# Patient Record
Sex: FEMALE | Race: WHITE | NOT HISPANIC OR LATINO | Employment: OTHER | URBAN - METROPOLITAN AREA
[De-identification: names, ages, dates, MRNs, and addresses within clinical notes are randomized per-mention and may not be internally consistent; named-entity substitution may affect disease eponyms.]

---

## 2017-11-21 ENCOUNTER — OFFICE VISIT (OUTPATIENT)
Dept: URGENT CARE | Facility: CLINIC | Age: 52
End: 2017-11-21
Payer: COMMERCIAL

## 2017-11-21 VITALS
OXYGEN SATURATION: 97 % | WEIGHT: 175 LBS | TEMPERATURE: 100 F | HEIGHT: 64 IN | SYSTOLIC BLOOD PRESSURE: 123 MMHG | BODY MASS INDEX: 29.88 KG/M2 | DIASTOLIC BLOOD PRESSURE: 86 MMHG | RESPIRATION RATE: 18 BRPM | HEART RATE: 92 BPM

## 2017-11-21 DIAGNOSIS — J06.9 UPPER RESPIRATORY TRACT INFECTION, UNSPECIFIED TYPE: Primary | ICD-10-CM

## 2017-11-21 LAB
CTP QC/QA: YES
FLUAV AG NPH QL: NEGATIVE
FLUBV AG NPH QL: NEGATIVE

## 2017-11-21 PROCEDURE — 99214 OFFICE O/P EST MOD 30 MIN: CPT | Mod: S$GLB,,, | Performed by: FAMILY MEDICINE

## 2017-11-21 PROCEDURE — 87804 INFLUENZA ASSAY W/OPTIC: CPT | Mod: QW,S$GLB,, | Performed by: FAMILY MEDICINE

## 2017-11-21 RX ORDER — BENZONATATE 100 MG/1
100 CAPSULE ORAL EVERY 6 HOURS PRN
Qty: 30 CAPSULE | Refills: 1 | Status: SHIPPED | OUTPATIENT
Start: 2017-11-21 | End: 2018-11-21

## 2017-11-21 NOTE — PATIENT INSTRUCTIONS
Viral Upper Respiratory Illness (Adult)  You have a viral upper respiratory illness (URI), which is another term for the common cold. This illness is contagious during the first few days. It is spread through the air by coughing and sneezing. It may also be spread by direct contact (touching the sick person and then touching your own eyes, nose, or mouth). Frequent handwashing will decrease risk of spread. Most viral illnesses go away within 7 to 10 days with rest and simple home remedies. Sometimes the illness may last for several weeks. Antibiotics will not kill a virus, and they are generally not prescribed for this condition.    Home care  · If symptoms are severe, rest at home for the first 2 to 3 days. When you resume activity, don't let yourself get too tired.  · Avoid being exposed to cigarette smoke (yours or others).  · You may use acetaminophen or ibuprofen to control pain and fever, unless another medicine was prescribed. (Note: If you have chronic liver or kidney disease, have ever had a stomach ulcer or gastrointestinal bleeding, or are taking blood-thinning medicines, talk with your healthcare provider before using these medicines.) Aspirin should never be given to anyone under 18 years of age who is ill with a viral infection or fever. It may cause severe liver or brain damage.  · Your appetite may be poor, so a light diet is fine. Avoid dehydration by drinking 6 to 8 glasses of fluids per day (water, soft drinks, juices, tea, or soup). Extra fluids will help loosen secretions in the nose and lungs.  · Over-the-counter cold medicines will not shorten the length of time youre sick, but they may be helpful for the following symptoms: cough, sore throat, and nasal and sinus congestion. (Note: Do not use decongestants if you have high blood pressure.)  Follow-up care  Follow up with your healthcare provider, or as advised.  When to seek medical advice  Call your healthcare provider right away if any  of these occur:  · Cough with lots of colored sputum (mucus)  · Severe headache; face, neck, or ear pain  · Difficulty swallowing due to throat pain  · Fever of 100.4°F (38°C)  Call 911, or get immediate medical care  Call emergency services right away if any of these occur:  · Chest pain, shortness of breath, wheezing, or difficulty breathing  · Coughing up blood  · Inability to swallow due to throat pain  Date Last Reviewed: 9/13/2015  © 9196-8325 [employer]. [address] [place] [address]. All rights reserved. This information is not intended as a substitute for professional medical care. Always follow your healthcare professional's instructions.

## 2017-11-21 NOTE — PROGRESS NOTES
"Subjective:       Patient ID: Priscilla Khan is a 52 y.o. female.    Vitals:  height is 5' 4" (1.626 m) and weight is 79.4 kg (175 lb). Her oral temperature is 100.3 °F (37.9 °C). Her blood pressure is 123/86 and her pulse is 92. Her respiration is 18 and oxygen saturation is 97%.     Chief Complaint: Cough    Cough, fever, sore throat and body aches x 1 day.  Pt has taken mucinex and nsaids with no relief.  No n/v/d.      Cough   This is a new problem. The current episode started yesterday. The problem has been gradually worsening. The cough is non-productive. Associated symptoms include chills, a fever, myalgias and a sore throat. Pertinent negatives include no chest pain, ear pain, eye redness, headaches, shortness of breath or wheezing. The symptoms are aggravated by cold air. She has tried OTC cough suppressant for the symptoms. The treatment provided no relief. Her past medical history is significant for asthma, bronchitis, environmental allergies and pneumonia.     Review of Systems   Constitution: Positive for chills, fever and malaise/fatigue.   HENT: Positive for congestion and sore throat. Negative for ear pain and hoarse voice.    Eyes: Negative for discharge and redness.   Cardiovascular: Negative for chest pain, dyspnea on exertion and leg swelling.   Respiratory: Positive for cough. Negative for shortness of breath, sputum production and wheezing.    Musculoskeletal: Positive for myalgias.   Gastrointestinal: Positive for abdominal pain. Negative for nausea.   Neurological: Negative for headaches.   Allergic/Immunologic: Positive for environmental allergies.       Objective:      Physical Exam   Constitutional: She appears well-developed and well-nourished.   HENT:   Head: Normocephalic and atraumatic.   Nose: Mucosal edema present. No rhinorrhea. Right sinus exhibits no maxillary sinus tenderness and no frontal sinus tenderness. Left sinus exhibits no maxillary sinus tenderness and no frontal sinus " tenderness.   Mouth/Throat: Posterior oropharyngeal erythema present. No oropharyngeal exudate.   Eyes: EOM are normal. Pupils are equal, round, and reactive to light.   Neck: Normal range of motion. Neck supple.   Cardiovascular: Normal rate, regular rhythm and normal heart sounds.    Pulmonary/Chest: Effort normal and breath sounds normal.   Abdominal: Soft.   Lymphadenopathy:     She has cervical adenopathy (MILDLY TENDER).   Nursing note and vitals reviewed.      Results for orders placed or performed in visit on 11/21/17   POCT Influenza A/B   Result Value Ref Range    Rapid Influenza A Ag Negative Negative    Rapid Influenza B Ag Negative Negative     Acceptable Yes      Assessment:       1. Upper respiratory tract infection, unspecified type        Plan:         Upper respiratory tract infection, unspecified type  -     POCT Influenza A/B  -     benzonatate (TESSALON PERLES) 100 MG capsule; Take 1 capsule (100 mg total) by mouth every 6 (six) hours as needed for Cough.  Dispense: 30 capsule; Refill: 1    OTC analgesia

## 2017-12-03 ENCOUNTER — HOSPITAL ENCOUNTER (OUTPATIENT)
Facility: HOSPITAL | Age: 52
Discharge: HOME | End: 2017-12-04
Attending: EMERGENCY MEDICINE | Admitting: HOSPITALIST
Payer: COMMERCIAL

## 2017-12-03 DIAGNOSIS — R07.9 CHEST PAIN: Primary | ICD-10-CM

## 2017-12-03 LAB
ALBUMIN SERPL BCP-MCNC: 4 G/DL
ALP SERPL-CCNC: 91 U/L
ALT SERPL W/O P-5'-P-CCNC: 29 U/L
AMPHET+METHAMPHET UR QL: NEGATIVE
ANION GAP SERPL CALC-SCNC: 9 MMOL/L
AST SERPL-CCNC: 25 U/L
BARBITURATES UR QL SCN>200 NG/ML: NEGATIVE
BASOPHILS # BLD AUTO: 0.03 K/UL
BASOPHILS NFR BLD: 0.4 %
BENZODIAZ UR QL SCN>200 NG/ML: NORMAL
BILIRUB SERPL-MCNC: 0.8 MG/DL
BNP SERPL-MCNC: 91 PG/ML
BUN SERPL-MCNC: 15 MG/DL
BZE UR QL SCN: NEGATIVE
CALCIUM SERPL-MCNC: 9.7 MG/DL
CANNABINOIDS UR QL SCN: NEGATIVE
CHLORIDE SERPL-SCNC: 105 MMOL/L
CO2 SERPL-SCNC: 27 MMOL/L
CREAT SERPL-MCNC: 0.8 MG/DL
CREAT UR-MCNC: 148 MG/DL
DIFFERENTIAL METHOD: ABNORMAL
EOSINOPHIL # BLD AUTO: 0.2 K/UL
EOSINOPHIL NFR BLD: 2 %
ERYTHROCYTE [DISTWIDTH] IN BLOOD BY AUTOMATED COUNT: 12.2 %
EST. GFR  (AFRICAN AMERICAN): >60 ML/MIN/1.73 M^2
EST. GFR  (NON AFRICAN AMERICAN): >60 ML/MIN/1.73 M^2
ESTIMATED AVG GLUCOSE: 108 MG/DL
ETHANOL UR-MCNC: <10 MG/DL
GLUCOSE SERPL-MCNC: 98 MG/DL
HBA1C MFR BLD HPLC: 5.4 %
HCT VFR BLD AUTO: 35.5 %
HGB BLD-MCNC: 12 G/DL
IMM GRANULOCYTES # BLD AUTO: 0.02 K/UL
IMM GRANULOCYTES NFR BLD AUTO: 0.3 %
LIPASE SERPL-CCNC: 10 U/L
LYMPHOCYTES # BLD AUTO: 3.6 K/UL
LYMPHOCYTES NFR BLD: 45.3 %
MCH RBC QN AUTO: 30.4 PG
MCHC RBC AUTO-ENTMCNC: 33.8 G/DL
MCV RBC AUTO: 90 FL
METHADONE UR QL SCN>300 NG/ML: NEGATIVE
MONOCYTES # BLD AUTO: 0.5 K/UL
MONOCYTES NFR BLD: 6.8 %
NEUTROPHILS # BLD AUTO: 3.6 K/UL
NEUTROPHILS NFR BLD: 45.2 %
NRBC BLD-RTO: 0 /100 WBC
OPIATES UR QL SCN: NEGATIVE
PCP UR QL SCN>25 NG/ML: NEGATIVE
PLATELET # BLD AUTO: 287 K/UL
PMV BLD AUTO: 9.7 FL
POTASSIUM SERPL-SCNC: 3.8 MMOL/L
PROT SERPL-MCNC: 7.5 G/DL
RBC # BLD AUTO: 3.95 M/UL
SODIUM SERPL-SCNC: 141 MMOL/L
TOXICOLOGY INFORMATION: NORMAL
TROPONIN I SERPL DL<=0.01 NG/ML-MCNC: 0.01 NG/ML
TROPONIN I SERPL DL<=0.01 NG/ML-MCNC: <0.006 NG/ML
TSH SERPL DL<=0.005 MIU/L-ACNC: 0.94 UIU/ML
WBC # BLD AUTO: 7.95 K/UL

## 2017-12-03 PROCEDURE — 85025 COMPLETE CBC W/AUTO DIFF WBC: CPT

## 2017-12-03 PROCEDURE — 99284 EMERGENCY DEPT VISIT MOD MDM: CPT | Mod: 25

## 2017-12-03 PROCEDURE — 83036 HEMOGLOBIN GLYCOSYLATED A1C: CPT

## 2017-12-03 PROCEDURE — 83880 ASSAY OF NATRIURETIC PEPTIDE: CPT

## 2017-12-03 PROCEDURE — G0378 HOSPITAL OBSERVATION PER HR: HCPCS

## 2017-12-03 PROCEDURE — 36415 COLL VENOUS BLD VENIPUNCTURE: CPT

## 2017-12-03 PROCEDURE — 25000003 PHARM REV CODE 250: Performed by: EMERGENCY MEDICINE

## 2017-12-03 PROCEDURE — 93005 ELECTROCARDIOGRAM TRACING: CPT

## 2017-12-03 PROCEDURE — 80053 COMPREHEN METABOLIC PANEL: CPT

## 2017-12-03 PROCEDURE — 99284 EMERGENCY DEPT VISIT MOD MDM: CPT | Mod: ,,, | Performed by: EMERGENCY MEDICINE

## 2017-12-03 PROCEDURE — 93010 ELECTROCARDIOGRAM REPORT: CPT | Mod: ,,, | Performed by: INTERNAL MEDICINE

## 2017-12-03 PROCEDURE — 84484 ASSAY OF TROPONIN QUANT: CPT

## 2017-12-03 PROCEDURE — 84443 ASSAY THYROID STIM HORMONE: CPT

## 2017-12-03 PROCEDURE — 99219 PR INITIAL OBSERVATION CARE,LEVL II: CPT | Mod: ,,, | Performed by: HOSPITALIST

## 2017-12-03 PROCEDURE — 80307 DRUG TEST PRSMV CHEM ANLYZR: CPT

## 2017-12-03 PROCEDURE — 83690 ASSAY OF LIPASE: CPT

## 2017-12-03 PROCEDURE — 84484 ASSAY OF TROPONIN QUANT: CPT | Mod: 91

## 2017-12-03 RX ORDER — SODIUM CHLORIDE 0.9 % (FLUSH) 0.9 %
5 SYRINGE (ML) INJECTION
Status: DISCONTINUED | OUTPATIENT
Start: 2017-12-03 | End: 2017-12-04 | Stop reason: HOSPADM

## 2017-12-03 RX ORDER — ENOXAPARIN SODIUM 100 MG/ML
40 INJECTION SUBCUTANEOUS EVERY 24 HOURS
Status: DISCONTINUED | OUTPATIENT
Start: 2017-12-03 | End: 2017-12-04 | Stop reason: HOSPADM

## 2017-12-03 RX ORDER — AMOXICILLIN 250 MG
1 CAPSULE ORAL 2 TIMES DAILY
Status: DISCONTINUED | OUTPATIENT
Start: 2017-12-03 | End: 2017-12-04 | Stop reason: HOSPADM

## 2017-12-03 RX ORDER — RAMIPRIL 10 MG/1
10 CAPSULE ORAL DAILY
Status: DISCONTINUED | OUTPATIENT
Start: 2017-12-04 | End: 2017-12-04 | Stop reason: HOSPADM

## 2017-12-03 RX ORDER — ATORVASTATIN CALCIUM 20 MG/1
20 TABLET, FILM COATED ORAL DAILY
Status: DISCONTINUED | OUTPATIENT
Start: 2017-12-04 | End: 2017-12-04 | Stop reason: HOSPADM

## 2017-12-03 RX ORDER — PANTOPRAZOLE SODIUM 40 MG/1
40 TABLET, DELAYED RELEASE ORAL DAILY
Status: DISCONTINUED | OUTPATIENT
Start: 2017-12-04 | End: 2017-12-04 | Stop reason: HOSPADM

## 2017-12-03 RX ORDER — NITROGLYCERIN 0.4 MG/1
0.4 TABLET SUBLINGUAL EVERY 5 MIN PRN
Status: DISCONTINUED | OUTPATIENT
Start: 2017-12-03 | End: 2017-12-04 | Stop reason: HOSPADM

## 2017-12-03 RX ORDER — ASPIRIN 325 MG
325 TABLET ORAL
Status: COMPLETED | OUTPATIENT
Start: 2017-12-03 | End: 2017-12-03

## 2017-12-03 RX ORDER — BENZONATATE 100 MG/1
100 CAPSULE ORAL EVERY 6 HOURS PRN
Status: DISCONTINUED | OUTPATIENT
Start: 2017-12-03 | End: 2017-12-04 | Stop reason: HOSPADM

## 2017-12-03 RX ADMIN — NITROGLYCERIN 0.4 MG: 0.4 TABLET SUBLINGUAL at 01:12

## 2017-12-03 RX ADMIN — ASPIRIN 325 MG ORAL TABLET 325 MG: 325 PILL ORAL at 01:12

## 2017-12-03 NOTE — ED NOTES
Patient updated on POC and room assignment. Denies chest pain at this time; VSS. Will continue to monitor.

## 2017-12-03 NOTE — ASSESSMENT & PLAN NOTE
Telemetry  Serial trop : .006 -> .009  EKG - NSR w LVH, 173/81. 125 /71  Stress echo in an if all ok.  PPI

## 2017-12-03 NOTE — ED NOTES
Pt placed on cardiac monitor, continuous pulse ox, cycling blood pressures. Side rails up x2, call bell in reach, bed in low position with brake engaged. Family at bedside.

## 2017-12-03 NOTE — ED NOTES
Assumed care of patient. Continous VS monitoring in place. Patient given SL nitro for chest pain 9/10.

## 2017-12-03 NOTE — ED NOTES
LOC: The patient is awake and alert; oriented x 3 and speaking appropriately.  APPEARANCE: Patient resting comfortably, patient is clean and well groomed, anxious  SKIN: warm and dry, normal skin turgor & moist mucus membranes, skin intact, no breakdown noted.  MUSCULOSKELETAL: Patient moving all extremities well, no obvious swelling or deformities noted  RESPIRATORY: Airway is open and patent,  respirations are spontaneous, normal effort and rate  CARDIAC: Patient has a normal rate, no peripheral edema noted, capillary refill < 3 seconds; complaints of chest pain in sternal area, sharp, radiating thru to upper back.   ABDOMEN: Soft and non tender to palpation, no distention noted.

## 2017-12-03 NOTE — ED PROVIDER NOTES
"Encounter Date: 12/3/2017    SCRIBE #1 NOTE: I, Rosmery Castro, am scribing for, and in the presence of,  Dr. Szymanski. I have scribed the following portions of the note - the Resident attestation.       History     Chief Complaint   Patient presents with    Chest Pain     left sided chest pain radiating to left next, onset 10 mins ago, no SOB, no N/V, c/o "little" dizzy     52 year old F presents with sudden onset chest pain that began 15 minutes PTA, substernal with radiation neck and back occurred while at rest. Quality Twisting and aching. No assoc Diaphoresis, SOB, Nausea at this time. She has sig Pmhx of HTN, HLD and concerning family history. Patient felt it may have been panic attack but reports no stressful event and no relief from xanax. Patient also has hx of GERD but no recent meals, spicy food, and feels that this pain is different than her typical GERD pain.           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    Posterior glottic pachydermia noted on endoscopy during ENT visit    Rhinitis, chronic     Vocal cord dysfunction 10/1/2015     Past Surgical History:   Procedure Laterality Date     SECTION       Family History   Problem Relation Age of Onset    Coronary artery disease Mother     Heart failure Father     Cancer Sister      breast    Coronary artery disease Brother      Social History   Substance Use Topics    Smoking status: Never Smoker    Smokeless tobacco: Never Used    Alcohol use 0.0 oz/week     Review of Systems   Constitutional: Negative for activity change, appetite change, chills, fatigue, fever and unexpected weight change.   HENT: Negative for congestion and " sinus pressure.    Eyes: Negative for discharge and visual disturbance.   Respiratory: Positive for cough. Negative for apnea, chest tightness, shortness of breath and wheezing.    Cardiovascular: Positive for chest pain. Negative for palpitations and leg swelling.   Gastrointestinal: Negative for abdominal distention, abdominal pain, diarrhea, nausea and vomiting.   Genitourinary: Negative for difficulty urinating and dysuria.   Musculoskeletal: Negative for arthralgias, back pain and myalgias.   Skin: Negative for color change and pallor.   Neurological: Negative for dizziness, syncope, light-headedness and headaches.   Psychiatric/Behavioral: Negative for agitation and behavioral problems. The patient is nervous/anxious.        Physical Exam     Initial Vitals [12/03/17 1306]   BP Pulse Resp Temp SpO2   (!) 173/81 77 18 99.3 °F (37.4 °C) 100 %      MAP       111.67         Physical Exam    Nursing note and vitals reviewed.  Constitutional: She appears well-developed and well-nourished. She is not diaphoretic.   Stated age, appears uncomfortable   HENT:   Head: Normocephalic and atraumatic.   Mouth/Throat: No oropharyngeal exudate.   Eyes: EOM are normal. Pupils are equal, round, and reactive to light. No scleral icterus.   Neck: Normal range of motion. Neck supple. No JVD present.   Cardiovascular: Normal rate, regular rhythm and normal heart sounds. Exam reveals no friction rub.    No murmur heard.  Pulmonary/Chest: Breath sounds normal. No respiratory distress. She has no wheezes. She has no rales.   Abdominal: Soft. Bowel sounds are normal. She exhibits no distension. There is no tenderness.   Musculoskeletal: Normal range of motion. She exhibits no edema or tenderness.   Lymphadenopathy:     She has no cervical adenopathy.   Neurological: She is alert and oriented to person, place, and time. She has normal strength. No sensory deficit.   Skin: Skin is warm and dry. Capillary refill takes less than 2 seconds.          ED Course   Procedures  Labs Reviewed   CBC W/ AUTO DIFFERENTIAL - Abnormal; Notable for the following:        Result Value    RBC 3.95 (*)     Hematocrit 35.5 (*)     All other components within normal limits   COMPREHENSIVE METABOLIC PANEL   TROPONIN I   B-TYPE NATRIURETIC PEPTIDE             Medical Decision Making:   History:   Old Medical Records: I decided to obtain old medical records.  Clinical Tests:   Lab Tests: Ordered and Reviewed  Radiological Study: Ordered and Reviewed  Medical Tests: Ordered and Reviewed       APC / Resident Notes:   HOIV MDM:  52 year old M with sig Pmhx of HTN, HLD, +Fam hx of CAD who presents with sudden onset concerning chest pain.     Concern for: NSTEMI, ACS, Angina, Pneumonia, pericarditis, GERD, anxiety attack    Plan: Will initiate cardiac workup at this time. EKG appears stable with no concerning ST elevations at this time. WIll provide Nitroglycerine to aid in relief. ASA will be provided.      Hilton Lozada MD  [employer] EM PGY-IV  1:14 PM      HOIV UPdate:  Initial troponin negative. Patient's pain improved with SLG Nitro. Due to risk factors and acute chest pain will admit for observation to internal medicine.  Hilton Lozada MD  [employer] EM PGY-IV  3:12 PM         Scribe Attestation:   Scribe #1: I performed the above scribed service and the documentation accurately describes the services I performed. I attest to the accuracy of the note.    Attending Attestation:   Physician Attestation Statement for Resident:  As the supervising MD   Physician Attestation Statement: I have personally seen and examined this patient.   I agree with the above history. -: 52 y.o. woman complains of an episode of chest discomfort radiating to her neck and back. She reports that it began feeling like a panic attack, however, it did not resolve when she took one of her anti-anxiety meds, and her panic attacks always resolve when she takes her anti-anxiety meds. Because it was worsening, she  decided to come here to the ED. Her pain resolved with one sublingual nitroglycerin. She denies shortness of breath, nausea, diaphoresis.    As the supervising MD I agree with the above PE.   -: Heart has regular rate and rhythm without murmur, rub, or gallop. Lungs are clear to auscultation.    As the supervising MD I agree with the above treatment, course, plan, and disposition.   -: Given patient's risk factors for heart disease including FHx (father had heart attack in early 50s, brother with MI at 48, second brother with CABG in early 50s), patient will be admitted for monitoring and serial troponins.                     ED Course      Clinical Impression:   The encounter diagnosis was Chest pain.    Disposition:   Disposition: Placed in Observation                        Hilton Lozada MD  Resident  12/03/17 8585

## 2017-12-03 NOTE — PROVIDER PROGRESS NOTES - EMERGENCY DEPT.
Encounter Date: 12/3/2017    ED Physician Progress Notes         EKG - STEMI Decision  Initial Reading: No STEMI present.

## 2017-12-03 NOTE — HPI
52 year old F presents with sudden onset chest pain that began 15 minutes PTA, substernal with radiation neck and back occurred while at rest. Quality Twisting and aching. No assoc Diaphoresis, SOB, Nausea at this time. She has sig Pmhx of HTN, HLD and concerning family history. Patient felt it may have been panic attack but reports no stressful event and no relief from xanax. Patient also has hx of GERD but no recent meals, spicy food, and feels that this pain is different than her typical GERD pain.     She reports the pain was severe, unlike previous episodes.  Radiated through to back.  P high in the ED came down on its own.  She takes her BP once weekly and normall in 120s/70s.       PMH:  Gerd, laryngeal spasm, HTN, rhinits, hyperlilpidemia, c -section  All - NKDA  Meds- see MAR: xanax, hydrodiuil, lipitor, altace,

## 2017-12-03 NOTE — ED TRIAGE NOTES
Onset approx 30 minutes PTA while riding in a car going to buy a [employer] tree. Pain is in sternal area  , sharp and radiates thru to her upper back.  C/O SOB w/ pain , denies nausea.  Slitly dizziness. Having generalized weakness. Hx of panic attacks. This time it is not going away after taking her xanax. Had a viral infection last week.

## 2017-12-03 NOTE — NURSING
Pt arrived to OBS 2 via stretcher with ED tech. NO distress noted at this time. Pt denies chest pain. Peripheral IV saline locked. Cardiac monitor on./ Pt oriented to room. Instructed to call for assistance. Will continue to monitor.

## 2017-12-03 NOTE — ED NOTES
Notified MD that patient's chest pain went from 9/10 to 7/10 back to 9/10 after the first dose of SL nitro.

## 2017-12-04 ENCOUNTER — DOCUMENTATION ONLY (OUTPATIENT)
Dept: CARDIOLOGY | Facility: CLINIC | Age: 52
End: 2017-12-04

## 2017-12-04 VITALS
HEART RATE: 71 BPM | DIASTOLIC BLOOD PRESSURE: 75 MMHG | OXYGEN SATURATION: 93 % | RESPIRATION RATE: 17 BRPM | WEIGHT: 185 LBS | HEIGHT: 64 IN | TEMPERATURE: 98 F | BODY MASS INDEX: 31.58 KG/M2 | SYSTOLIC BLOOD PRESSURE: 132 MMHG

## 2017-12-04 LAB
ALBUMIN SERPL BCP-MCNC: 3.6 G/DL
ALP SERPL-CCNC: 77 U/L
ALT SERPL W/O P-5'-P-CCNC: 27 U/L
ANION GAP SERPL CALC-SCNC: 9 MMOL/L
AST SERPL-CCNC: 23 U/L
BILIRUB SERPL-MCNC: 0.7 MG/DL
BUN SERPL-MCNC: 19 MG/DL
CALCIUM SERPL-MCNC: 9.4 MG/DL
CHLORIDE SERPL-SCNC: 104 MMOL/L
CO2 SERPL-SCNC: 28 MMOL/L
CREAT SERPL-MCNC: 0.8 MG/DL
DIASTOLIC DYSFUNCTION: NO
EST. GFR  (AFRICAN AMERICAN): >60 ML/MIN/1.73 M^2
EST. GFR  (NON AFRICAN AMERICAN): >60 ML/MIN/1.73 M^2
GLUCOSE SERPL-MCNC: 106 MG/DL
MAGNESIUM SERPL-MCNC: 2.1 MG/DL
PHOSPHATE SERPL-MCNC: 4 MG/DL
POCT GLUCOSE: 100 MG/DL (ref 70–110)
POTASSIUM SERPL-SCNC: 4.3 MMOL/L
PROT SERPL-MCNC: 6.8 G/DL
RETIRED EF AND QEF - SEE NOTES: 60 (ref 55–65)
SODIUM SERPL-SCNC: 141 MMOL/L
TROPONIN I SERPL DL<=0.01 NG/ML-MCNC: 0.01 NG/ML
TROPONIN I SERPL DL<=0.01 NG/ML-MCNC: <0.006 NG/ML

## 2017-12-04 PROCEDURE — 84484 ASSAY OF TROPONIN QUANT: CPT

## 2017-12-04 PROCEDURE — 80053 COMPREHEN METABOLIC PANEL: CPT

## 2017-12-04 PROCEDURE — 93351 STRESS TTE COMPLETE: CPT | Mod: 26,,, | Performed by: INTERNAL MEDICINE

## 2017-12-04 PROCEDURE — 83735 ASSAY OF MAGNESIUM: CPT

## 2017-12-04 PROCEDURE — 25000003 PHARM REV CODE 250: Performed by: HOSPITALIST

## 2017-12-04 PROCEDURE — 93321 DOPPLER ECHO F-UP/LMTD STD: CPT | Mod: 26,,, | Performed by: INTERNAL MEDICINE

## 2017-12-04 PROCEDURE — 99217 PR OBSERVATION CARE DISCHARGE: CPT | Mod: ,,, | Performed by: HOSPITALIST

## 2017-12-04 PROCEDURE — G0378 HOSPITAL OBSERVATION PER HR: HCPCS

## 2017-12-04 PROCEDURE — 84100 ASSAY OF PHOSPHORUS: CPT

## 2017-12-04 RX ORDER — PANTOPRAZOLE SODIUM 40 MG/1
40 TABLET, DELAYED RELEASE ORAL DAILY
Qty: 30 TABLET | Refills: 11 | Status: SHIPPED | OUTPATIENT
Start: 2017-12-05 | End: 2022-03-03

## 2017-12-04 RX ORDER — AMOXICILLIN 250 MG
1 CAPSULE ORAL 2 TIMES DAILY
COMMUNITY
Start: 2017-12-04 | End: 2022-03-03

## 2017-12-04 RX ADMIN — PANTOPRAZOLE SODIUM 40 MG: 40 TABLET, DELAYED RELEASE ORAL at 09:12

## 2017-12-04 RX ADMIN — ATORVASTATIN CALCIUM 20 MG: 20 TABLET, FILM COATED ORAL at 09:12

## 2017-12-04 RX ADMIN — STANDARDIZED SENNA CONCENTRATE AND DOCUSATE SODIUM 1 TABLET: 8.6; 5 TABLET, FILM COATED ORAL at 09:12

## 2017-12-04 RX ADMIN — RAMIPRIL 10 MG: 10 CAPSULE ORAL at 09:12

## 2017-12-04 NOTE — H&P
"[facility] Hospital Medicine  History & Physical    Patient Name: Priscilla Khan  MRN: [id number]  Admission Date: 12/3/2017  Attending Physician: Bonnie Washburn MD  Primary Care Provider: Tsering Devine MD    [facility] Medicine Team: [facility] MED  Bonnie Washburn MD     Patient information was obtained from patient and ER records.     Subjective:     Principal Problem:Chest pain    Chief Complaint:   Chief Complaint   Patient presents with    Chest Pain     left sided chest pain radiating to left next, onset 10 mins ago, no SOB, no N/V, c/o "little" dizzy        HPI: 52 year old F presents with sudden onset chest pain that began 15 minutes PTA, substernal with radiation neck and back occurred while at rest. Quality Twisting and aching. No assoc Diaphoresis, SOB, Nausea at this time. She has sig Pmhx of HTN, HLD and concerning family history. Patient felt it may have been panic attack but reports no stressful event and no relief from xanax. Patient also has hx of GERD but no recent meals, spicy food, and feels that this pain is different than her typical GERD pain.     She reports the pain was severe, unlike previous episodes.  Radiated through to back.  P high in the ED came down on its own.  She takes her BP once weekly and normall in 120s/70s.       PMH:  Gerd, laryngeal spasm, HTN, rhinits, hyperlilpidemia, c -section  All - NKDA  Meds- see MAR: xanax, hydrodiuil, lipitor, altace,     Interval History: See hospital course      Review of Systems   Constitutional: Negative for chills, diaphoresis, fatigue and fever.   HENT: Negative for congestion, nosebleeds and trouble swallowing.    Eyes: Negative for photophobia and visual disturbance.   Respiratory: Negative for cough, choking, chest tightness and shortness of breath.    Cardiovascular: Positive for chest pain. Negative for leg swelling.   Gastrointestinal: Negative for abdominal distention, abdominal pain, constipation, diarrhea, " nausea and vomiting.   Genitourinary: Negative for difficulty urinating and urgency.   Musculoskeletal: Negative for arthralgias, back pain, gait problem, joint swelling, myalgias, neck pain and neck stiffness.   Skin: Negative for rash.   Neurological: Negative for dizziness, numbness and headaches.   Psychiatric/Behavioral: Negative for agitation, behavioral problems, confusion, decreased concentration and dysphoric mood.     Objective:     Vital Signs (Most Recent):  Temp: 98.2 °F (36.8 °C) (12/03/17 1634)  Pulse: 66 (12/03/17 1900)  Resp: 16 (12/03/17 1634)  BP: 135/69 (12/03/17 1634)  SpO2: 98 % (12/03/17 1634) Vital Signs (24h Range):  Temp:  [98.2 °F (36.8 °C)-99.3 °F (37.4 °C)] 98.2 °F (36.8 °C)  Pulse:  [62-81] 66  Resp:  [15-19] 16  SpO2:  [96 %-100 %] 98 %  BP: (125-173)/(69-98) 135/69     Weight: 81.6 kg (180 lb)  Body mass index is 30.9 kg/m².  No intake or output data in the 24 hours ending 12/03/17 2002   Physical Exam   Constitutional: She is oriented to person, place, and time. She appears well-developed and well-nourished.   HENT:   Head: Normocephalic and atraumatic.   Eyes: Conjunctivae are normal. Pupils are equal, round, and reactive to light. No scleral icterus.   Neck: Normal range of motion. Neck supple.   Cardiovascular: Normal rate, regular rhythm, normal heart sounds and intact distal pulses.    Pulmonary/Chest: Effort normal and breath sounds normal. She has no wheezes.   Abdominal: Soft. Bowel sounds are normal. She exhibits no distension and no mass. There is no tenderness. There is no rebound and no guarding.   Musculoskeletal: Normal range of motion. She exhibits no edema.   Lymphadenopathy:     She has no cervical adenopathy.   Neurological: She is alert and oriented to person, place, and time.   Skin: Skin is warm and dry. No rash noted. She is not diaphoretic.   Psychiatric: She has a normal mood and affect. Her behavior is normal. Thought content normal.       Significant Labs:    BMP:   Recent Labs  Lab 12/03/17  1326   GLU 98      K 3.8      CO2 27   BUN 15   CREATININE 0.8   CALCIUM 9.7     CBC:   Recent Labs  Lab 12/03/17  1326   WBC 7.95   HGB 12.0   HCT 35.5*          Significant Imaging: I have reviewed and interpreted all pertinent imaging results/findings within the past 24 hours.cxr    Assessment/Plan:     * Chest pain    Telemetry  Serial trop : .006 -> .009  EKG - NSR w LVH, 173/81. 125 /71  Stress echo in an if all ok.  PPI            GERD (gastroesophageal reflux disease)    PPI  Consider f/u w GI            VTE Risk Mitigation         Ordered     enoxaparin injection 40 mg  Daily     Route:  Subcutaneous        12/03/17 1649     Medium Risk of VTE  Once      12/03/17 1649             Bonnie Washburn MD  Department of Hospital Medicine   [facility]

## 2017-12-04 NOTE — PROGRESS NOTES
Pt returned to unit via w/c from stress test.  Pt aaox3, no distress noted.  Call light placed within reach.

## 2017-12-04 NOTE — PLAN OF CARE
Plan is to discharge home with family.       12/04/17 1421   Final Note   Assessment Type Final Discharge Note   Discharge Disposition Home   Hospital Follow Up  Appt(s) scheduled? No   Discharge plans and expectations educations in teach back method with documentation complete? Yes   Right Care Referral Info   Post Acute Recommendation No Care

## 2017-12-04 NOTE — SUBJECTIVE & OBJECTIVE
Interval History: See hospital course      Review of Systems   Constitutional: Negative for chills, diaphoresis, fatigue and fever.   HENT: Negative for congestion, nosebleeds and trouble swallowing.    Eyes: Negative for photophobia and visual disturbance.   Respiratory: Negative for cough, choking, chest tightness and shortness of breath.    Cardiovascular: Positive for chest pain. Negative for leg swelling.   Gastrointestinal: Negative for abdominal distention, abdominal pain, constipation, diarrhea, nausea and vomiting.   Genitourinary: Negative for difficulty urinating and urgency.   Musculoskeletal: Negative for arthralgias, back pain, gait problem, joint swelling, myalgias, neck pain and neck stiffness.   Skin: Negative for rash.   Neurological: Negative for dizziness, numbness and headaches.   Psychiatric/Behavioral: Negative for agitation, behavioral problems, confusion, decreased concentration and dysphoric mood.     Objective:     Vital Signs (Most Recent):  Temp: 98.2 °F (36.8 °C) (12/03/17 1634)  Pulse: 66 (12/03/17 1900)  Resp: 16 (12/03/17 1634)  BP: 135/69 (12/03/17 1634)  SpO2: 98 % (12/03/17 1634) Vital Signs (24h Range):  Temp:  [98.2 °F (36.8 °C)-99.3 °F (37.4 °C)] 98.2 °F (36.8 °C)  Pulse:  [62-81] 66  Resp:  [15-19] 16  SpO2:  [96 %-100 %] 98 %  BP: (125-173)/(69-98) 135/69     Weight: 81.6 kg (180 lb)  Body mass index is 30.9 kg/m².  No intake or output data in the 24 hours ending 12/03/17 2002   Physical Exam   Constitutional: She is oriented to person, place, and time. She appears well-developed and well-nourished.   HENT:   Head: Normocephalic and atraumatic.   Eyes: Conjunctivae are normal. Pupils are equal, round, and reactive to light. No scleral icterus.   Neck: Normal range of motion. Neck supple.   Cardiovascular: Normal rate, regular rhythm, normal heart sounds and intact distal pulses.    Pulmonary/Chest: Effort normal and breath sounds normal. She has no wheezes.   Abdominal: Soft.  Bowel sounds are normal. She exhibits no distension and no mass. There is no tenderness. There is no rebound and no guarding.   Musculoskeletal: Normal range of motion. She exhibits no edema.   Lymphadenopathy:     She has no cervical adenopathy.   Neurological: She is alert and oriented to person, place, and time.   Skin: Skin is warm and dry. No rash noted. She is not diaphoretic.   Psychiatric: She has a normal mood and affect. Her behavior is normal. Thought content normal.       Significant Labs:   BMP:   Recent Labs  Lab 12/03/17  1326   GLU 98      K 3.8      CO2 27   BUN 15   CREATININE 0.8   CALCIUM 9.7     CBC:   Recent Labs  Lab 12/03/17  1326   WBC 7.95   HGB 12.0   HCT 35.5*          Significant Imaging: I have reviewed and interpreted all pertinent imaging results/findings within the past 24 hours.cxr

## 2017-12-04 NOTE — ASSESSMENT & PLAN NOTE
Telemetry  Serial trop : .006 -> .009  EKG - NSR w LVH, 173/81. 125 /71  Stress echo in an if all ok.  PPI  12/4 - resolved,  recommeded TUMs prn, continue protonix, if recurrent then f/u w  GI.  F/u pcp.

## 2017-12-04 NOTE — PLAN OF CARE
Problem: Patient Care Overview  Goal: Plan of Care Review  Outcome: Ongoing (interventions implemented as appropriate)  Pt aaox3, vss, no s/s of distress noted.  Pt denies pain.  Safety precautions maintained, pt remains free of falls.  No events noted on telemetry.  Stress Test today.  Call light within reach.

## 2017-12-04 NOTE — DISCHARGE SUMMARY
[facility] Hospital Medicine  Discharge Summary      Patient Name: Priscilla Khan  MRN: [id number]  Admission Date: 12/3/2017  Hospital Length of Stay: 0 days  Discharge Date and Time: No discharge date for patient encounter.  Attending Physician: Bonnie Washburn MD   Discharging Provider: Bonnie Washburn MD  Primary Care Provider: Tsering Devine MD  [facility] Medicine Team: [facility]OS[facility] MED G Bonnie Washburn MD    HPI:   52 year old F presents with sudden onset chest pain that began 15 minutes PTA, substernal with radiation neck and back occurred while at rest. Quality Twisting and aching. No assoc Diaphoresis, SOB, Nausea at this time. She has sig Pmhx of HTN, HLD and concerning family history. Patient felt it may have been panic attack but reports no stressful event and no relief from xanax. Patient also has hx of GERD but no recent meals, spicy food, and feels that this pain is different than her typical GERD pain.     She reports the pain was severe, unlike previous episodes.  Radiated through to back.  P high in the ED came down on its own.  She takes her BP once weekly and normall in 120s/70s.       PMH:  Gerd, laryngeal spasm, HTN, rhinits, hyperlilpidemia, c -section  All - NKDA  Meds- see MAR: xanax, hydrodiuil, lipitor, altace,     * No surgery found *      Hospital Course:   12/4 - no more episodes of chest pain.  Stress echo neg for ischsmia, ef 60 %, no LVH.     Consults:     * Chest pain    Telemetry  Serial trop : .006 -> .009  EKG - NSR w LVH, 173/81. 125 /71  Stress echo in an if all ok.  PPI  12/4 - resolved,  recommeded TUMs prn, continue protonix, if recurrent then f/u w  GI.  F/u pcp.             GERD (gastroesophageal reflux disease)    PPI  Consider f/u w GI            Final Active Diagnoses:    Diagnosis Date Noted POA    PRINCIPAL PROBLEM:  Chest pain [R07.9] 12/03/2017 Yes    GERD (gastroesophageal reflux disease) [K21.9]  Yes      Problems Resolved During this  Admission:    Diagnosis Date Noted Date Resolved POA       Discharged Condition: good    Disposition: Home or Self Care    Follow Up:  Follow-up Information     Tsering Devine MD.    Specialty:  Internal Medicine  Contact information:  [address] [place] [id number] [phone]                 Patient Instructions:     Diet general     Activity as tolerated         Significant Diagnostic Studies: Labs:   CMP   Recent Labs  Lab 12/03/17  1326 12/04/17  0342    141   K 3.8 4.3    104   CO2 27 28   GLU 98 106   BUN 15 19   CREATININE 0.8 0.8   CALCIUM 9.7 9.4   PROT 7.5 6.8   ALBUMIN 4.0 3.6   BILITOT 0.8 0.7   ALKPHOS 91 77   AST 25 23   ALT 29 27   ANIONGAP 9 9   ESTGFRAFRICA >60.0 >60.0   EGFRNONAA >60.0 >60.0   , CBC   Recent Labs  Lab 12/03/17  1326   WBC 7.95   HGB 12.0   HCT 35.5*       and Troponin   Recent Labs  Lab 12/04/17  0342   TROPONINI 0.008       Pending Diagnostic Studies:     None         Medications:  Reconciled Home Medications:   Current Discharge Medication List      START taking these medications    Details   pantoprazole (PROTONIX) 40 MG tablet Take 1 tablet (40 mg total) by mouth once daily.  Qty: 30 tablet, Refills: 11      senna-docusate 8.6-50 mg (PERICOLACE) 8.6-50 mg per tablet Take 1 tablet by mouth 2 (two) times daily.         CONTINUE these medications which have NOT CHANGED    Details   alprazolam (XANAX) 0.25 MG tablet Take 0.25 mg by mouth 3 (three) times daily as needed for Anxiety.      atorvastatin (LIPITOR) 20 MG tablet Take 20 mg by mouth once daily.      benzonatate (TESSALON PERLES) 100 MG capsule Take 1 capsule (100 mg total) by mouth every 6 (six) hours as needed for Cough.  Qty: 30 capsule, Refills: 1    Associated Diagnoses: Upper respiratory tract infection, unspecified type      hydrochlorothiazide (HYDRODIURIL) 12.5 MG Tab Take 12.5 mg by mouth once daily.      ramipril (ALTACE) 10 MG capsule              Indwelling  Lines/Drains at time of discharge:   Lines/Drains/Airways          No matching active lines, drains, or airways          Time spent on the discharge of patient: 35  minutes  Patient was seen and examined on the date of discharge and determined to be suitable for discharge.         Bonnie Washburn MD  Department of Hospital Medicine  [facility]

## 2017-12-04 NOTE — PLAN OF CARE
CM called patient for discharge planning.  Plan is to discharge home with family.    Pharmacy:    [id number] - [place], [place] - [address] [facility] [address]  [address]  [facility] [address]  Phone: [phone] Fax: [phone]    PCP:  Tsering Devine MD    Payor: [employer] / Plan: [employer] / Product Type: Commercial /        12/04/17 1200   Discharge Assessment   Assessment Type Discharge Planning Assessment   Confirmed/corrected address and phone number on facesheet? Yes   Assessment information obtained from? Patient   Expected Length of Stay (days) 1   Communicated expected length of stay with patient/caregiver no   Prior to hospitilization cognitive status: Alert/Oriented   Prior to hospitalization functional status: Independent   Current cognitive status: Alert/Oriented   Current Functional Status: Independent   Facility Arrived From: Home   Lives With spouse;child(rosalva), dependent   Able to Return to Prior Arrangements yes   Is patient able to care for self after discharge? Yes   Who are your caregiver(s) and their phone number(s)? Arvind Funes - spouse [phone]   Patient's perception of discharge disposition home or selfcare   Readmission Within The Last 30 Days no previous admission in last 30 days   Patient currently being followed by outpatient case management? No   Patient currently receives any other outside agency services? No   Equipment Currently Used at Home none   Do you have any problems affording any of your prescribed medications? No   Is the patient taking medications as prescribed? yes   Does the patient have transportation home? Yes   Transportation Available family or friend will provide   Does the patient receive services at the Coumadin Clinic? No   Discharge Plan A Home with family   Discharge Plan B Home Health   Patient/Family In Agreement With Plan yes

## 2017-12-04 NOTE — PLAN OF CARE
Problem: Patient Care Overview  Goal: Plan of Care Review  Outcome: Ongoing (interventions implemented as appropriate)  Pt. Remains on fall precautions bed low and locked side rails up no falls sustained. Remains on cardiac monitor SB-SR 50-60's no complaints of Chest pain or shortness of breath. Denies complaint of CP instructed to call nurse right away if experiencing pain verbalizes  Understanding. Continue plan  Of care.

## 2017-12-04 NOTE — PROGRESS NOTES
Patient identified by 2 identifiers. Denies previous reactions to blood transfusions and allergies reviewed.  Procedure explained & consent obtained.  IV in place to Rt FA, flushed w/ 10cc NS pre & post contrast administration.  1.5cc Definity administered, echo images obtained.  Pt tolerated procedure well.  Pt returned to [facility] via wc accompanied by escort.

## 2017-12-04 NOTE — PROGRESS NOTES
Pt discharged to home. Discharge instructions given to pt who voiced understanding. New meds electronically sent to pt's pharmacy. IV removed catheter intact. Denies pain or discomfort. Respirations even and unlabored. No distress noted. Pt stable. Refused wheelchair and will ambulate off unit to meet spouse in front of hospital.

## 2017-12-04 NOTE — PLAN OF CARE
Problem: Patient Care Overview  Goal: Plan of Care Review  Outcome: Ongoing (interventions implemented as appropriate)  Plan of care reviewed with patient. No distress noted at this time. Peripheral IV saline locked. Cardiac monitoring maintained without ectopy. Pt denies chest pain at this time. Will continue to monitor closely.

## 2022-03-03 ENCOUNTER — OFFICE VISIT (OUTPATIENT)
Dept: URGENT CARE | Facility: CLINIC | Age: 57
End: 2022-03-03
Payer: COMMERCIAL

## 2022-03-03 VITALS
WEIGHT: 185 LBS | TEMPERATURE: 101 F | DIASTOLIC BLOOD PRESSURE: 89 MMHG | HEART RATE: 85 BPM | RESPIRATION RATE: 19 BRPM | OXYGEN SATURATION: 97 % | BODY MASS INDEX: 31.58 KG/M2 | SYSTOLIC BLOOD PRESSURE: 133 MMHG | HEIGHT: 64 IN

## 2022-03-03 DIAGNOSIS — J06.9 VIRAL URI: Primary | ICD-10-CM

## 2022-03-03 PROBLEM — E78.5 HYPERLIPIDEMIA: Status: ACTIVE | Noted: 2021-06-14

## 2022-03-03 PROBLEM — N39.3 URINARY, INCONTINENCE, STRESS FEMALE: Status: ACTIVE | Noted: 2021-06-14

## 2022-03-03 PROBLEM — N39.0 URINARY TRACT INFECTION: Status: ACTIVE | Noted: 2022-03-03

## 2022-03-03 PROBLEM — R50.9 FEVER: Status: ACTIVE | Noted: 2021-11-10

## 2022-03-03 PROBLEM — K21.9 GERD (GASTROESOPHAGEAL REFLUX DISEASE): Status: ACTIVE | Noted: 2019-02-04

## 2022-03-03 PROBLEM — K57.92 DIVERTICULITIS OF INTESTINE, PART UNSPECIFIED, WITHOUT PERFORATION OR ABSCESS WITHOUT BLEEDING: Status: ACTIVE | Noted: 2021-11-10

## 2022-03-03 PROBLEM — F41.9 ANXIETY DISORDER, UNSPECIFIED: Status: ACTIVE | Noted: 2022-03-03

## 2022-03-03 LAB
CTP QC/QA: YES
CTP QC/QA: YES
POC MOLECULAR INFLUENZA A AGN: NEGATIVE
POC MOLECULAR INFLUENZA B AGN: NEGATIVE
SARS-COV-2 RDRP RESP QL NAA+PROBE: NEGATIVE

## 2022-03-03 PROCEDURE — U0002: ICD-10-PCS | Mod: QW,S$GLB,, | Performed by: PHYSICIAN ASSISTANT

## 2022-03-03 PROCEDURE — 3008F BODY MASS INDEX DOCD: CPT | Mod: CPTII,S$GLB,, | Performed by: PHYSICIAN ASSISTANT

## 2022-03-03 PROCEDURE — 87502 INFLUENZA DNA AMP PROBE: CPT | Mod: QW,S$GLB,, | Performed by: PHYSICIAN ASSISTANT

## 2022-03-03 PROCEDURE — 3075F SYST BP GE 130 - 139MM HG: CPT | Mod: CPTII,S$GLB,, | Performed by: PHYSICIAN ASSISTANT

## 2022-03-03 PROCEDURE — 4010F PR ACE/ARB THEARPY RXD/TAKEN: ICD-10-PCS | Mod: CPTII,S$GLB,, | Performed by: PHYSICIAN ASSISTANT

## 2022-03-03 PROCEDURE — 3079F PR MOST RECENT DIASTOLIC BLOOD PRESSURE 80-89 MM HG: ICD-10-PCS | Mod: CPTII,S$GLB,, | Performed by: PHYSICIAN ASSISTANT

## 2022-03-03 PROCEDURE — 1159F PR MEDICATION LIST DOCUMENTED IN MEDICAL RECORD: ICD-10-PCS | Mod: CPTII,S$GLB,, | Performed by: PHYSICIAN ASSISTANT

## 2022-03-03 PROCEDURE — 3079F DIAST BP 80-89 MM HG: CPT | Mod: CPTII,S$GLB,, | Performed by: PHYSICIAN ASSISTANT

## 2022-03-03 PROCEDURE — 4010F ACE/ARB THERAPY RXD/TAKEN: CPT | Mod: CPTII,S$GLB,, | Performed by: PHYSICIAN ASSISTANT

## 2022-03-03 PROCEDURE — 3008F PR BODY MASS INDEX (BMI) DOCUMENTED: ICD-10-PCS | Mod: CPTII,S$GLB,, | Performed by: PHYSICIAN ASSISTANT

## 2022-03-03 PROCEDURE — 1160F RVW MEDS BY RX/DR IN RCRD: CPT | Mod: CPTII,S$GLB,, | Performed by: PHYSICIAN ASSISTANT

## 2022-03-03 PROCEDURE — 1159F MED LIST DOCD IN RCRD: CPT | Mod: CPTII,S$GLB,, | Performed by: PHYSICIAN ASSISTANT

## 2022-03-03 PROCEDURE — 99203 OFFICE O/P NEW LOW 30 MIN: CPT | Mod: S$GLB,,, | Performed by: PHYSICIAN ASSISTANT

## 2022-03-03 PROCEDURE — 87502 POCT INFLUENZA A/B MOLECULAR: ICD-10-PCS | Mod: QW,S$GLB,, | Performed by: PHYSICIAN ASSISTANT

## 2022-03-03 PROCEDURE — 3075F PR MOST RECENT SYSTOLIC BLOOD PRESS GE 130-139MM HG: ICD-10-PCS | Mod: CPTII,S$GLB,, | Performed by: PHYSICIAN ASSISTANT

## 2022-03-03 PROCEDURE — 1160F PR REVIEW ALL MEDS BY PRESCRIBER/CLIN PHARMACIST DOCUMENTED: ICD-10-PCS | Mod: CPTII,S$GLB,, | Performed by: PHYSICIAN ASSISTANT

## 2022-03-03 PROCEDURE — U0002 COVID-19 LAB TEST NON-CDC: HCPCS | Mod: QW,S$GLB,, | Performed by: PHYSICIAN ASSISTANT

## 2022-03-03 PROCEDURE — 99203 PR OFFICE/OUTPT VISIT, NEW, LEVL III, 30-44 MIN: ICD-10-PCS | Mod: S$GLB,,, | Performed by: PHYSICIAN ASSISTANT

## 2022-03-03 RX ORDER — CIPROFLOXACIN 500 MG/1
TABLET ORAL
COMMUNITY
Start: 2021-11-10

## 2022-03-03 RX ORDER — FLUTICASONE PROPIONATE 50 MCG
1 SPRAY, SUSPENSION (ML) NASAL DAILY
Qty: 11.1 ML | Refills: 0 | Status: SHIPPED | OUTPATIENT
Start: 2022-03-03

## 2022-03-03 NOTE — LETTER
March 3, 2022      Urgent Care [address] [address]  Phone: [phone]  Fax: [phone]       Patient: Priscilla Khan   YOB: 1965  Date of Visit: 03/03/2022    To Whom It May Concern:    Abran Khan  was at [facility] on 03/03/2022. The patient may return to work/school on 3/6/22 with no restrictions. If you have any questions or concerns, or if I can be of further assistance, please do not hesitate to contact me.    Sincerely,    Leslie Manley PA-C

## 2022-03-03 NOTE — PATIENT INSTRUCTIONS
Your test was NEGATIVE for COVID-19 (coronavirus).      You may leave home and/or return to work when the following conditions are met:  24 hours fever free without fever-reducing medications AND  Improved symptoms      If your symptoms worsen or if you have any other concerns, please contact [facility] at [phone].     Sincerely,    Leslie Manley PA-C    PLEASE READ YOUR DISCHARGE INSTRUCTIONS ENTIRELY AS IT CONTAINS IMPORTANT INFORMATION.      Please drink plenty of fluids.    Please get plenty of rest.    Please return here or go to the Emergency Department for any concerns or worsening of condition.    Please take an over the counter antihistamine medication (allegra/Claritin/Zyrtec) of your choice as directed.    Try an over the counter decongestant like Mucinex D or Sudafed. You buy this behind the pharmacy counter    If you do have Hypertension or palpitations, it is safe to take Coricidin HBP for relief of sinus symptoms.    If not allergic, please take over the counter Tylenol (Acetaminophen) and/or Motrin (Ibuprofen) as directed for control of pain and/or fever.  Please follow up with your primary care doctor or specialist as needed.    Sore throat recommendations: Warm fluids, warm salt water gargles, throat lozenges, tea, honey, soup, rest, hydration.    Use over the counter flonase: one spray each nostril twice daily OR two sprays each nostril once daily.     Sinus rinses DO NOT USE TAP WATER, if you must, water must be a rolling boil for 1 minute, let it cool, then use.  May use distilled water, or over the counter nasal saline rinses.  Vics vapor rub in shower to help open nasal passages.  May use nasal gel to keep passages moisturized.  May use Nasal saline sprays during the day for added relief of congestion.   For those who go to the gym, please do not use the sauna or steam room now to clear sinuses.    If you  smoke, please stop smoking.      Please return or see your primary care  doctor if you develop new or worsening symptoms.     Please arrange follow up with your primary medical clinic as soon as possible. You must understand that you've received an Urgent Care treatment only and that you may be released before all of your medical problems are known or treated. You, the patient, will arrange for follow up as instructed. If your symptoms worsen or fail to improve you should go to the Emergency Room.

## 2022-03-03 NOTE — PROGRESS NOTES
"Subjective:       Patient ID: Priscilla Khan is a 57 y.o. female.    Vitals:  height is 5' 4" (1.626 m) and weight is 83.9 kg (185 lb). Her temperature is 100.8 °F (38.2 °C) (abnormal). Her blood pressure is 133/89 and her pulse is 85. Her respiration is 19 and oxygen saturation is 97%.     Chief Complaint: Nasal Congestion (Feel like I have the flu. Sudden onset of fever and body aches - Entered by patient)    Patient is a 57 who presents with complaint of sore throat, fever, headache and cough x2 days.  Pt states her symptoms started last night and worsened this mroning.  Pt states she has taken tylenol and Claritin for her symptoms. Patient denies nausea, vomiting, or diarrhea. She states she has not had any chest pain or shortness of breath. She states her cough is non-productive. Patient denies and known sick contacts.     Sore Throat   This is a new problem. The current episode started yesterday. The problem has been gradually worsening. Neither side of throat is experiencing more pain than the other. There has been no fever. The pain is at a severity of 6/10. The pain is moderate. Associated symptoms include congestion, coughing and headaches. Pertinent negatives include no abdominal pain, diarrhea, ear pain, shortness of breath, trouble swallowing or vomiting. She has had no exposure to strep or mono. Treatments tried: tylenol, claritin. The treatment provided mild relief.       Constitution: Positive for chills, fatigue and fever.   HENT: Positive for congestion and sore throat. Negative for ear pain, postnasal drip, sinus pain, sinus pressure and trouble swallowing.    Neck: Negative for painful lymph nodes.   Cardiovascular: Negative for chest pain.   Respiratory: Positive for cough. Negative for sputum production, shortness of breath and wheezing.    Gastrointestinal: Negative for abdominal pain, nausea, vomiting and diarrhea.   Musculoskeletal: Positive for muscle ache.   Neurological: Positive for " headaches.   Hematologic/Lymphatic: Negative for swollen lymph nodes.       Objective:      Physical Exam   Constitutional: She is oriented to person, place, and time. She appears well-developed. She is cooperative.  Non-toxic appearance. She does not appear ill. No distress.   HENT:   Head: Normocephalic and atraumatic.   Ears:   Right Ear: Hearing, tympanic membrane, external ear and ear canal normal. Tympanic membrane is not injected, not erythematous and not bulging.   Left Ear: Hearing, tympanic membrane, external ear and ear canal normal. Tympanic membrane is not injected, not erythematous and not bulging.   Nose: Congestion present. No mucosal edema, rhinorrhea or nasal deformity. No epistaxis.   Mouth/Throat: Uvula is midline and mucous membranes are normal. No trismus in the jaw. Normal dentition. No uvula swelling. Posterior oropharyngeal erythema present. No oropharyngeal exudate or posterior oropharyngeal edema. No tonsillar exudate.   Eyes: Conjunctivae and lids are normal. No scleral icterus.   Neck: Trachea normal and phonation normal. Neck supple. No edema present. No erythema present. No neck rigidity present.   Cardiovascular: Normal rate, regular rhythm, normal heart sounds and normal pulses.   No murmur heard.  Pulmonary/Chest: Effort normal and breath sounds normal. No respiratory distress. She has no decreased breath sounds. She has no wheezes. She has no rhonchi.   Abdominal: Normal appearance.   Musculoskeletal: Normal range of motion.         General: No deformity. Normal range of motion.   Lymphadenopathy:     She has no cervical adenopathy.   Neurological: She is alert and oriented to person, place, and time. She exhibits normal muscle tone. Coordination normal.   Skin: Skin is warm, dry, intact, not diaphoretic and not pale.   Psychiatric: Her speech is normal and behavior is normal. Judgment and thought content normal.   Nursing note and vitals reviewed.    Results for orders placed or  performed in visit on 03/03/22   POCT COVID-19 Rapid Screening   Result Value Ref Range    POC Rapid COVID Negative Negative     Acceptable Yes    POCT Influenza A/B MOLECULAR   Result Value Ref Range    POC Molecular Influenza A Ag Negative Negative, Not Reported    POC Molecular Influenza B Ag Negative Negative, Not Reported     Acceptable Yes            Assessment:       1. Viral URI          Plan:         Viral URI  -     POCT COVID-19 Rapid Screening  -     Cancel: POCT Strep A, Molecular  -     POCT Influenza A/B MOLECULAR  -     fluticasone propionate (FLONASE) 50 mcg/actuation nasal spray; 1 spray (50 mcg total) by Each Nostril route once daily.  Dispense: 11.1 mL; Refill: 0    Discussed results with patient.  Discussed use of OTC medications for symptom control as this is likely a viral disease.   All ER precautions covered including but not limited to shortness of breath, intractable fever, or chest pain.  Discussed RTC if symptoms worsen, change, or persist.     Patient verbalized understanding and agreed with the plan.     Leslie Manley PA-C    Patient Instructions   Your test was NEGATIVE for COVID-19 (coronavirus).      You may leave home and/or return to work when the following conditions are met:   24 hours fever free without fever-reducing medications AND   Improved symptoms      If your symptoms worsen or if you have any other concerns, please contact [facility] at [phone].     Sincerely,    Leslie Manley PA-C    PLEASE READ YOUR DISCHARGE INSTRUCTIONS ENTIRELY AS IT CONTAINS IMPORTANT INFORMATION.      Please drink plenty of fluids.    Please get plenty of rest.    Please return here or go to the Emergency Department for any concerns or worsening of condition.    Please take an over the counter antihistamine medication (allegra/Claritin/Zyrtec) of your choice as directed.    Try an over the counter decongestant like Mucinex D or Sudafed. You buy this  behind the pharmacy counter    If you do have Hypertension or palpitations, it is safe to take Coricidin HBP for relief of sinus symptoms.    If not allergic, please take over the counter Tylenol (Acetaminophen) and/or Motrin (Ibuprofen) as directed for control of pain and/or fever.  Please follow up with your primary care doctor or specialist as needed.    Sore throat recommendations: Warm fluids, warm salt water gargles, throat lozenges, tea, honey, soup, rest, hydration.    Use over the counter flonase: one spray each nostril twice daily OR two sprays each nostril once daily.     Sinus rinses DO NOT USE TAP WATER, if you must, water must be a rolling boil for 1 minute, let it cool, then use.  May use distilled water, or over the counter nasal saline rinses.  Vics vapor rub in shower to help open nasal passages.  May use nasal gel to keep passages moisturized.  May use Nasal saline sprays during the day for added relief of congestion.   For those who go to the gym, please do not use the sauna or steam room now to clear sinuses.    If you  smoke, please stop smoking.      Please return or see your primary care doctor if you develop new or worsening symptoms.     Please arrange follow up with your primary medical clinic as soon as possible. You must understand that you've received an Urgent Care treatment only and that you may be released before all of your medical problems are known or treated. You, the patient, will arrange for follow up as instructed. If your symptoms worsen or fail to improve you should go to the Emergency Room.

## 2022-09-28 ENCOUNTER — OFFICE VISIT (OUTPATIENT)
Dept: URGENT CARE | Facility: CLINIC | Age: 57
End: 2022-09-28
Payer: COMMERCIAL

## 2022-09-28 VITALS
DIASTOLIC BLOOD PRESSURE: 93 MMHG | TEMPERATURE: 98 F | HEIGHT: 64 IN | WEIGHT: 185 LBS | HEART RATE: 85 BPM | RESPIRATION RATE: 16 BRPM | OXYGEN SATURATION: 98 % | SYSTOLIC BLOOD PRESSURE: 137 MMHG | BODY MASS INDEX: 31.58 KG/M2

## 2022-09-28 DIAGNOSIS — R10.11 RIGHT UPPER QUADRANT PAIN: ICD-10-CM

## 2022-09-28 DIAGNOSIS — R10.31 RIGHT LOWER QUADRANT ABDOMINAL PAIN: Primary | ICD-10-CM

## 2022-09-28 DIAGNOSIS — R19.8 ABDOMINAL GUARDING: ICD-10-CM

## 2022-09-28 LAB
BILIRUB UR QL STRIP: NEGATIVE
GLUCOSE UR QL STRIP: NEGATIVE
KETONES UR QL STRIP: NEGATIVE
LEUKOCYTE ESTERASE UR QL STRIP: NEGATIVE
PH, POC UA: 8 (ref 5–8)
POC BLOOD, URINE: NEGATIVE
POC NITRATES, URINE: NEGATIVE
PROT UR QL STRIP: NEGATIVE
SP GR UR STRIP: 1 (ref 1–1.03)
UROBILINOGEN UR STRIP-ACNC: NORMAL (ref 0.1–1.1)

## 2022-09-28 PROCEDURE — 3008F PR BODY MASS INDEX (BMI) DOCUMENTED: ICD-10-PCS | Mod: CPTII,S$GLB,,

## 2022-09-28 PROCEDURE — 3008F BODY MASS INDEX DOCD: CPT | Mod: CPTII,S$GLB,,

## 2022-09-28 PROCEDURE — 3075F PR MOST RECENT SYSTOLIC BLOOD PRESS GE 130-139MM HG: ICD-10-PCS | Mod: CPTII,S$GLB,,

## 2022-09-28 PROCEDURE — 4010F ACE/ARB THERAPY RXD/TAKEN: CPT | Mod: CPTII,S$GLB,,

## 2022-09-28 PROCEDURE — 3075F SYST BP GE 130 - 139MM HG: CPT | Mod: CPTII,S$GLB,,

## 2022-09-28 PROCEDURE — 1159F PR MEDICATION LIST DOCUMENTED IN MEDICAL RECORD: ICD-10-PCS | Mod: CPTII,S$GLB,,

## 2022-09-28 PROCEDURE — 81003 URINALYSIS AUTO W/O SCOPE: CPT | Mod: QW,S$GLB,,

## 2022-09-28 PROCEDURE — 99214 PR OFFICE/OUTPT VISIT, EST, LEVL IV, 30-39 MIN: ICD-10-PCS | Mod: S$GLB,,,

## 2022-09-28 PROCEDURE — 4010F PR ACE/ARB THEARPY RXD/TAKEN: ICD-10-PCS | Mod: CPTII,S$GLB,,

## 2022-09-28 PROCEDURE — 1160F RVW MEDS BY RX/DR IN RCRD: CPT | Mod: CPTII,S$GLB,,

## 2022-09-28 PROCEDURE — 3080F DIAST BP >= 90 MM HG: CPT | Mod: CPTII,S$GLB,,

## 2022-09-28 PROCEDURE — 3080F PR MOST RECENT DIASTOLIC BLOOD PRESSURE >= 90 MM HG: ICD-10-PCS | Mod: CPTII,S$GLB,,

## 2022-09-28 PROCEDURE — 1159F MED LIST DOCD IN RCRD: CPT | Mod: CPTII,S$GLB,,

## 2022-09-28 PROCEDURE — 99214 OFFICE O/P EST MOD 30 MIN: CPT | Mod: S$GLB,,,

## 2022-09-28 PROCEDURE — 1160F PR REVIEW ALL MEDS BY PRESCRIBER/CLIN PHARMACIST DOCUMENTED: ICD-10-PCS | Mod: CPTII,S$GLB,,

## 2022-09-28 PROCEDURE — 81003 POCT URINALYSIS, DIPSTICK, AUTOMATED, W/O SCOPE: ICD-10-PCS | Mod: QW,S$GLB,,

## 2022-09-28 RX ORDER — OLMESARTAN MEDOXOMIL 5 MG/1
5 TABLET ORAL DAILY
COMMUNITY
Start: 2022-09-02

## 2022-09-28 NOTE — PATIENT INSTRUCTIONS
PLEASE READ ALL DISCHARGE INSTRUCTIONS     ER Referral   Your condition is serious and requires immediate attention and evaluation in an ER setting.  You were referred to [facility] at   [address] [place] [id number].    GO STRAIGHT TO THE ER AND DO NOT EAT OR DRINK ANYTHING UNLESS A HEALTHCARE PROVIDER GIVES IT TO YOU.

## 2022-09-28 NOTE — PROGRESS NOTES
"Subjective:       Patient ID: Priscilla Khan is a 57 y.o. female.    Vitals:  height is 5' 4" (1.626 m) and weight is 83.9 kg (185 lb). Her temperature is 98.1 °F (36.7 °C). Her blood pressure is 137/93 (abnormal) and her pulse is 85. Her respiration is 16 and oxygen saturation is 98%.     Chief Complaint: Abdominal Pain (Entered by patient)    Patient started having pain in the right flank area then it travel under her ribs to the right as well it uncomfortable  to sit up pain is 6/10 last night 10/10 and     Provider note starts below:  Patient presents endorsing right upper quadrant pain.  She states last night after eating dinner approx 1.5 hrs later she started having an uncomfortable pain in her right flank area. She states over night the pain got worse and started to become more diffuse and into her right upper quadrant under her ribs. She says right now the pain is 6/10 but last night was 10/10. Pain is constant but she says sometimes it feels like an ache and other times its sharp. The pain is worse with movement and deep breaths. She has not eaten today but states she does have an appetite. Had loose stool this morning. She denies nausea, vomiting, constipation, blood in stool, fever, chills, body aches. Hx of  section and diverticulitis. Hx of gerd but states it does not feel similar. She has not taken anything for pain. Denies bloating. She has hx for RUQ pain back in  and had an US done which was normal. Says the pain is different than then. No urinary sx. No injury or trauma. Reports she does not drink often and if she does its only 1 alcoholic beverage a week. No hx of liver dz.     Abdominal Pain  This is a new problem. The current episode started yesterday. The onset quality is sudden. The problem has been unchanged. The pain is located in the RUQ and right flank. The pain is at a severity of 6/10. The pain is moderate. The quality of the pain is aching and sharp. The abdominal pain " radiates to the RUQ and right flank. Associated symptoms include diarrhea. Pertinent negatives include no anorexia, arthralgias, belching, constipation, dysuria, fever, flatus, frequency, headaches, hematochezia, hematuria, melena, myalgias, nausea, vomiting or weight loss. Nothing aggravates the pain. The pain is relieved by Nothing. Treatments tried: heating pad seem to help some. There is no history of abdominal surgery.     Constitution: Negative for appetite change, chills and fever.   Gastrointestinal:  Positive for abdominal pain and diarrhea. Negative for abdominal trauma, abdominal bloating, history of abdominal surgery, nausea, vomiting, constipation, bright red blood in stool, dark colored stools and rectal bleeding.   Genitourinary:  Positive for flank pain. Negative for dysuria, frequency, urgency and hematuria.   Musculoskeletal:  Negative for joint pain and muscle ache.   Neurological:  Negative for dizziness, passing out and headaches.     Objective:      Physical Exam   Constitutional: She is oriented to person, place, and time. She appears well-developed.   HENT:   Head: Normocephalic and atraumatic.   Ears:   Right Ear: External ear normal.   Left Ear: External ear normal.   Nose: Nose normal.   Mouth/Throat: Mucous membranes are normal.   Eyes: Conjunctivae and lids are normal.   Neck: Trachea normal. Neck supple.   Cardiovascular: Normal rate, regular rhythm and normal heart sounds.   Pulmonary/Chest: Effort normal and breath sounds normal. No respiratory distress.   Abdominal: Normal appearance and bowel sounds are normal. She exhibits no distension and no mass. Soft. There is abdominal tenderness. There is guarding (RLQ), tenderness at McBurney's point, positive Rovsing's sign and right CVA tenderness. There is no rebound, negative Earl's sign and no left CVA tenderness. No hernia.      Comments: Sharp pain on palpation of noted areas   Musculoskeletal: Normal range of motion.          General: Normal range of motion.   Neurological: She is alert and oriented to person, place, and time. She has normal strength.   Skin: Skin is warm, dry, intact, not diaphoretic and not pale.   Psychiatric: Her speech is normal and behavior is normal. Judgment and thought content normal.   Nursing note and vitals reviewed.      Results for orders placed or performed in visit on 09/28/22   POCT Urinalysis, Dipstick, Automated, W/O Scope   Result Value Ref Range    POC Blood, Urine Negative Negative    POC Bilirubin, Urine Negative Negative    POC Urobilinogen, Urine norm 0.1 - 1.1    POC Ketones, Urine Negative Negative    POC Protein, Urine Negative Negative    POC Nitrates, Urine Negative Negative    POC Glucose, Urine Negative Negative    pH, UA 8.0 5 - 8    POC Specific Gravity, Urine 1.005 1.003 - 1.029    POC Leukocytes, Urine Negative Negative       Assessment:       1. Right lower quadrant abdominal pain    2. Abdominal guarding    3. Right upper quadrant pain          Plan:     No XR in clinic, however, do not believe abdominal XR would be able to rule out possible etiologies. VSS and no systemic signs at this time. Non toxic appearing. Concerned for early appendicitis vs cholecystitis vs lower lobe pneumonia however no URI sx or coughing so this is less likely. Case discussed with dr leon del toro who agreed patient needs further workup in ED for more imaging. Offered medicine for pain but patient deferred. Patient states she will discuss with her PCP prior to going. Patient discharged in NAD.     Right lower quadrant abdominal pain  -     POCT Urinalysis, Dipstick, Automated, W/O Scope  -     Refer to Emergency Dept.    Abdominal guarding  -     Refer to Emergency Dept.    Right upper quadrant pain  -     Refer to Emergency Dept.       Patient Instructions   PLEASE READ ALL DISCHARGE INSTRUCTIONS     ER Referral   Your condition is serious and requires immediate attention and evaluation in an ER setting.   You were referred to [facility] at   [address] [place] [address]  [id number].    GO STRAIGHT TO THE ER AND DO NOT EAT OR DRINK ANYTHING UNLESS A HEALTHCARE PROVIDER GIVES IT TO YOU.

## 2022-10-03 ENCOUNTER — LAB VISIT (OUTPATIENT)
Dept: LAB | Facility: OTHER | Age: 57
End: 2022-10-03
Attending: INTERNAL MEDICINE
Payer: COMMERCIAL

## 2022-10-03 DIAGNOSIS — R19.7 DIARRHEA, UNSPECIFIED: ICD-10-CM

## 2022-10-03 DIAGNOSIS — R10.11 ABDOMINAL PAIN, RIGHT UPPER QUADRANT: Primary | ICD-10-CM

## 2022-10-03 DIAGNOSIS — K57.30 DIVERTICULOSIS LARGE INTESTINE W/O PERFORATION OR ABSCESS W/O BLEEDING: ICD-10-CM

## 2022-10-03 DIAGNOSIS — I10 HYPERTENSION: ICD-10-CM

## 2022-10-03 PROCEDURE — 89055 LEUKOCYTE ASSESSMENT FECAL: CPT | Performed by: INTERNAL MEDICINE

## 2022-10-03 PROCEDURE — 87045 FECES CULTURE AEROBIC BACT: CPT | Performed by: INTERNAL MEDICINE

## 2022-10-03 PROCEDURE — 87427 SHIGA-LIKE TOXIN AG IA: CPT | Mod: 59 | Performed by: INTERNAL MEDICINE

## 2022-10-03 PROCEDURE — 82656 EL-1 FECAL QUAL/SEMIQ: CPT | Performed by: INTERNAL MEDICINE

## 2022-10-03 PROCEDURE — 83993 ASSAY FOR CALPROTECTIN FECAL: CPT | Performed by: INTERNAL MEDICINE

## 2022-10-03 PROCEDURE — 87046 STOOL CULTR AEROBIC BACT EA: CPT | Mod: 59 | Performed by: INTERNAL MEDICINE

## 2022-10-04 LAB — WBC #/AREA STL HPF: NORMAL /[HPF]

## 2022-10-05 LAB
E COLI SXT1 STL QL IA: NEGATIVE
E COLI SXT2 STL QL IA: NEGATIVE

## 2022-10-06 LAB — ELASTASE 1, FECAL: 228 MCG/G

## 2022-10-07 LAB — BACTERIA STL CULT: NORMAL

## 2022-10-10 LAB — CALPROTECTIN STL-MCNT: <27.1 MCG/G
